# Patient Record
Sex: MALE | Race: WHITE | NOT HISPANIC OR LATINO | URBAN - METROPOLITAN AREA
[De-identification: names, ages, dates, MRNs, and addresses within clinical notes are randomized per-mention and may not be internally consistent; named-entity substitution may affect disease eponyms.]

---

## 2017-08-22 ENCOUNTER — IMPORTED ENCOUNTER (OUTPATIENT)
Dept: URBAN - METROPOLITAN AREA CLINIC 9 | Facility: CLINIC | Age: 62
End: 2017-08-22

## 2017-11-17 ENCOUNTER — IMPORTED ENCOUNTER (OUTPATIENT)
Dept: URBAN - METROPOLITAN AREA CLINIC 9 | Facility: CLINIC | Age: 62
End: 2017-11-17

## 2019-02-27 ENCOUNTER — IMPORTED ENCOUNTER (OUTPATIENT)
Dept: URBAN - METROPOLITAN AREA CLINIC 9 | Facility: CLINIC | Age: 64
End: 2019-02-27

## 2019-11-13 ENCOUNTER — IMPORTED ENCOUNTER (OUTPATIENT)
Dept: URBAN - METROPOLITAN AREA CLINIC 9 | Facility: CLINIC | Age: 64
End: 2019-11-13

## 2019-12-02 ENCOUNTER — IMPORTED ENCOUNTER (OUTPATIENT)
Dept: URBAN - METROPOLITAN AREA CLINIC 9 | Facility: CLINIC | Age: 64
End: 2019-12-02

## 2020-02-26 ENCOUNTER — IMPORTED ENCOUNTER (OUTPATIENT)
Dept: URBAN - METROPOLITAN AREA CLINIC 9 | Facility: CLINIC | Age: 65
End: 2020-02-26

## 2020-04-06 ENCOUNTER — IMPORTED ENCOUNTER (OUTPATIENT)
Dept: URBAN - METROPOLITAN AREA CLINIC 9 | Facility: CLINIC | Age: 65
End: 2020-04-06

## 2020-04-20 ENCOUNTER — IMPORTED ENCOUNTER (OUTPATIENT)
Dept: URBAN - METROPOLITAN AREA CLINIC 9 | Facility: CLINIC | Age: 65
End: 2020-04-20

## 2020-05-04 ENCOUNTER — IMPORTED ENCOUNTER (OUTPATIENT)
Dept: URBAN - METROPOLITAN AREA CLINIC 9 | Facility: CLINIC | Age: 65
End: 2020-05-04

## 2020-06-01 ENCOUNTER — IMPORTED ENCOUNTER (OUTPATIENT)
Dept: URBAN - METROPOLITAN AREA CLINIC 9 | Facility: CLINIC | Age: 65
End: 2020-06-01

## 2020-07-13 ENCOUNTER — IMPORTED ENCOUNTER (OUTPATIENT)
Dept: URBAN - METROPOLITAN AREA CLINIC 9 | Facility: CLINIC | Age: 65
End: 2020-07-13

## 2020-07-16 NOTE — PATIENT DISCUSSION
Lid closure problems likely associated with Graves' disease. Recommend gel drops at bedtime. May be having nocturnal exophthalmos.

## 2020-10-19 ENCOUNTER — IMPORTED ENCOUNTER (OUTPATIENT)
Dept: URBAN - METROPOLITAN AREA CLINIC 9 | Facility: CLINIC | Age: 65
End: 2020-10-19

## 2021-03-09 ENCOUNTER — IMPORTED ENCOUNTER (OUTPATIENT)
Dept: URBAN - METROPOLITAN AREA CLINIC 9 | Facility: CLINIC | Age: 66
End: 2021-03-09

## 2021-04-19 ENCOUNTER — IMPORTED ENCOUNTER (OUTPATIENT)
Dept: URBAN - METROPOLITAN AREA CLINIC 9 | Facility: CLINIC | Age: 66
End: 2021-04-19

## 2021-10-16 ASSESSMENT — TONOMETRY
OS_IOP_MMHG: 14
OS_IOP_MMHG: 13
OS_IOP_MMHG: 13
OD_IOP_MMHG: 13
OS_IOP_MMHG: 13
OD_IOP_MMHG: 10
OD_IOP_MMHG: 17
OS_IOP_MMHG: 14
OD_IOP_MMHG: 13
OS_IOP_MMHG: 13
OS_IOP_MMHG: 16
OS_IOP_MMHG: 13
OD_IOP_MMHG: 17
OS_IOP_MMHG: 10
OS_IOP_MMHG: 14
OD_IOP_MMHG: 15
OS_IOP_MMHG: 16
OD_IOP_MMHG: 11
OD_IOP_MMHG: 15
OD_IOP_MMHG: 18
OD_IOP_MMHG: 13
OD_IOP_MMHG: 16
OD_IOP_MMHG: 15
OD_IOP_MMHG: 14
OS_IOP_MMHG: 14
OD_IOP_MMHG: 16
OS_IOP_MMHG: 11
OS_IOP_MMHG: 12

## 2021-10-16 ASSESSMENT — VISUAL ACUITY
OS_CC: 20/30 SN
OS_CC: 20/40 -2 SN
OD_CC: 20/25 -2 SN
OS_SC: 20/100 -2 SN
OD_SC: 20/30 - SN
OD_CC: 20/20 SN
OS_CC: 20/40 - SN
OD_SC: 20/25 - SN
OD_CC: 20/20 - SN
OS_CC: 20/40 SN
OD_CC: 20/30 SN
OS_CC: 20/40 - SN
OD_CC: 20/20 -2 SN
OS_CC: 20/40 - SN
OD_CC: 20/20 SN
OS_CC: 20/25 - SN
OS_CC: 20/30 - SN
OS_CC: 20/30 - SN
OD_SC: 20/30 SN
OD_CC: 20/20 - SN
OS_SC: 20/60 SN
OS_CC: 20/50 + SN
OD_CC: 20/25 + SN
OS_CC: 20/40 - SN
OD_CC: 20/20 -2 SN
OS_CC: 20/40 SN
OD_CC: 20/25 SN
OD_CC: 20/25 + SN
OS_CC: 20/30 - SN
OS_CC: 20/30 -2 SN
OS_SC: 20/60 SN
OS_CC: 20/25 -2 SN
OD_CC: 20/25 SN
OD_CC: 20/25 -2 SN
OD_CC: 20/25 -2 SN
OD_CC: 20/25 SN
OS_CC: 20/30 + SN

## 2022-03-21 ENCOUNTER — ESTABLISHED PATIENT (OUTPATIENT)
Dept: URBAN - METROPOLITAN AREA CLINIC 14 | Facility: CLINIC | Age: 67
End: 2022-03-21

## 2022-03-21 DIAGNOSIS — H35.372: ICD-10-CM

## 2022-03-21 DIAGNOSIS — H35.413: ICD-10-CM

## 2022-03-21 DIAGNOSIS — H33.312: ICD-10-CM

## 2022-03-21 DIAGNOSIS — H43.812: ICD-10-CM

## 2022-03-21 DIAGNOSIS — H25.13: ICD-10-CM

## 2022-03-21 PROCEDURE — 92134 CPTRZ OPH DX IMG PST SGM RTA: CPT

## 2022-03-21 PROCEDURE — 92014 COMPRE OPH EXAM EST PT 1/>: CPT

## 2022-03-21 ASSESSMENT — TONOMETRY
OD_IOP_MMHG: 16
OS_IOP_MMHG: 16

## 2022-03-21 ASSESSMENT — VISUAL ACUITY
OS_CC: 20/50+2
OD_CC: 20/25-1

## 2022-03-22 ENCOUNTER — ESTABLISHED PATIENT (OUTPATIENT)
Dept: URBAN - METROPOLITAN AREA CLINIC 14 | Facility: CLINIC | Age: 67
End: 2022-03-22

## 2022-03-22 DIAGNOSIS — H43.812: ICD-10-CM

## 2022-03-22 DIAGNOSIS — H25.13: ICD-10-CM

## 2022-03-22 DIAGNOSIS — H33.312: ICD-10-CM

## 2022-03-22 DIAGNOSIS — H35.372: ICD-10-CM

## 2022-03-22 DIAGNOSIS — H35.413: ICD-10-CM

## 2022-03-22 PROCEDURE — 92134 CPTRZ OPH DX IMG PST SGM RTA: CPT

## 2022-03-22 PROCEDURE — 92014 COMPRE OPH EXAM EST PT 1/>: CPT

## 2022-03-22 PROCEDURE — 92015 DETERMINE REFRACTIVE STATE: CPT

## 2022-03-22 ASSESSMENT — VISUAL ACUITY
OS_CC: 20/50-1
OD_CC: 20/30-1
OD_GLARE: 20/30
OS_GLARE: 20/50

## 2022-03-22 ASSESSMENT — TONOMETRY
OD_IOP_MMHG: 16
OS_IOP_MMHG: 15

## 2022-10-03 ENCOUNTER — ESTABLISHED PATIENT (OUTPATIENT)
Dept: URBAN - METROPOLITAN AREA CLINIC 14 | Facility: CLINIC | Age: 67
End: 2022-10-03

## 2022-10-03 DIAGNOSIS — H43.812: ICD-10-CM

## 2022-10-03 DIAGNOSIS — H35.413: ICD-10-CM

## 2022-10-03 DIAGNOSIS — H35.372: ICD-10-CM

## 2022-10-03 DIAGNOSIS — H33.312: ICD-10-CM

## 2022-10-03 PROCEDURE — 92014 COMPRE OPH EXAM EST PT 1/>: CPT

## 2022-10-03 PROCEDURE — 92134 CPTRZ OPH DX IMG PST SGM RTA: CPT

## 2022-10-03 ASSESSMENT — TONOMETRY
OD_IOP_MMHG: 11
OS_IOP_MMHG: 11

## 2022-10-03 ASSESSMENT — VISUAL ACUITY
OD_CC: 20/20
OS_CC: 20/40-1

## 2022-12-12 ENCOUNTER — ESTABLISHED PATIENT (OUTPATIENT)
Dept: URBAN - METROPOLITAN AREA CLINIC 14 | Facility: CLINIC | Age: 67
End: 2022-12-12

## 2022-12-12 PROCEDURE — 99211PRE PRE OP VISIT

## 2022-12-12 ASSESSMENT — TONOMETRY
OD_IOP_MMHG: 18
OS_IOP_MMHG: 17

## 2022-12-12 ASSESSMENT — VISUAL ACUITY
OS_CC: 20/50+2
OD_CC: 20/20

## 2023-12-04 ENCOUNTER — ESTABLISHED PATIENT (OUTPATIENT)
Dept: URBAN - METROPOLITAN AREA CLINIC 14 | Facility: CLINIC | Age: 68
End: 2023-12-04

## 2023-12-04 DIAGNOSIS — H25.13: ICD-10-CM

## 2023-12-04 DIAGNOSIS — H43.812: ICD-10-CM

## 2023-12-04 DIAGNOSIS — H33.312: ICD-10-CM

## 2023-12-04 DIAGNOSIS — H35.372: ICD-10-CM

## 2023-12-04 DIAGNOSIS — H35.413: ICD-10-CM

## 2023-12-04 PROCEDURE — 99214 OFFICE O/P EST MOD 30 MIN: CPT

## 2023-12-04 PROCEDURE — 92134 CPTRZ OPH DX IMG PST SGM RTA: CPT

## 2023-12-04 ASSESSMENT — TONOMETRY
OD_IOP_MMHG: 18
OS_IOP_MMHG: 17

## 2023-12-04 ASSESSMENT — VISUAL ACUITY
OD_CC: 20/20
OS_CC: 20/60

## 2024-01-26 ENCOUNTER — POST-OP (OUTPATIENT)
Dept: URBAN - METROPOLITAN AREA CLINIC 17 | Facility: CLINIC | Age: 69
End: 2024-01-26

## 2024-01-26 DIAGNOSIS — H35.372: ICD-10-CM

## 2024-01-26 PROCEDURE — 99024 POSTOP FOLLOW-UP VISIT: CPT

## 2024-01-26 ASSESSMENT — TONOMETRY
OS_IOP_MMHG: 9
OD_IOP_MMHG: 15

## 2024-01-26 ASSESSMENT — VISUAL ACUITY
OD_CC: 20/20-1
OS_CC: 20/70-1

## 2024-02-12 ENCOUNTER — POST-OP (OUTPATIENT)
Dept: URBAN - METROPOLITAN AREA CLINIC 14 | Facility: CLINIC | Age: 69
End: 2024-02-12

## 2024-02-12 DIAGNOSIS — H33.312: ICD-10-CM

## 2024-02-12 DIAGNOSIS — H43.812: ICD-10-CM

## 2024-02-12 DIAGNOSIS — H35.372: ICD-10-CM

## 2024-02-12 PROCEDURE — 99024 POSTOP FOLLOW-UP VISIT: CPT

## 2024-02-12 PROCEDURE — 92134 CPTRZ OPH DX IMG PST SGM RTA: CPT

## 2024-02-12 ASSESSMENT — TONOMETRY
OD_IOP_MMHG: 16
OS_IOP_MMHG: 16

## 2024-02-12 ASSESSMENT — VISUAL ACUITY
OS_CC: 20/60-1
OD_CC: 20/20-1

## 2024-04-01 ENCOUNTER — POST-OP (OUTPATIENT)
Dept: URBAN - METROPOLITAN AREA CLINIC 14 | Facility: CLINIC | Age: 69
End: 2024-04-01

## 2024-04-01 DIAGNOSIS — H35.372: ICD-10-CM

## 2024-04-01 PROCEDURE — 99024 POSTOP FOLLOW-UP VISIT: CPT

## 2024-04-01 ASSESSMENT — TONOMETRY
OS_IOP_MMHG: 10
OD_IOP_MMHG: 13

## 2024-04-01 ASSESSMENT — VISUAL ACUITY
OD_CC: 20/25
OS_CC: 20/200

## 2024-05-23 ENCOUNTER — EMERGENCY VISIT (OUTPATIENT)
Dept: URBAN - METROPOLITAN AREA CLINIC 14 | Facility: CLINIC | Age: 69
End: 2024-05-23

## 2024-05-23 DIAGNOSIS — H33.312: ICD-10-CM

## 2024-05-23 DIAGNOSIS — H25.13: ICD-10-CM

## 2024-05-23 DIAGNOSIS — H35.372: ICD-10-CM

## 2024-05-23 DIAGNOSIS — H43.812: ICD-10-CM

## 2024-05-23 PROCEDURE — 92134 CPTRZ OPH DX IMG PST SGM RTA: CPT

## 2024-05-23 PROCEDURE — 99213 OFFICE O/P EST LOW 20 MIN: CPT

## 2024-05-23 ASSESSMENT — VISUAL ACUITY
OS_PH: 20/400
OD_CC: 20/25
OU_CC: 20/20
OS_CC: 20/400

## 2024-05-23 ASSESSMENT — TONOMETRY
OD_IOP_MMHG: 12
OS_IOP_MMHG: 11

## 2024-06-24 ENCOUNTER — ESTABLISHED PATIENT (OUTPATIENT)
Facility: LOCATION | Age: 69
End: 2024-06-24

## 2024-06-24 DIAGNOSIS — H25.13: ICD-10-CM

## 2024-06-24 DIAGNOSIS — H35.413: ICD-10-CM

## 2024-06-24 DIAGNOSIS — H33.312: ICD-10-CM

## 2024-06-24 DIAGNOSIS — H35.372: ICD-10-CM

## 2024-06-24 PROCEDURE — 92015 DETERMINE REFRACTIVE STATE: CPT

## 2024-06-24 PROCEDURE — 92134 CPTRZ OPH DX IMG PST SGM RTA: CPT

## 2024-06-24 PROCEDURE — 99214 OFFICE O/P EST MOD 30 MIN: CPT

## 2024-06-24 ASSESSMENT — VISUAL ACUITY
OS_GLARE: 20/100-1
OD_CC: 20/25
OS_CC: 20/200
OU_SC: 20/40
OD_SC: 20/40-1
OS_SC: 20/200
OD_GLARE: 20/25
OU_CC: 20/25

## 2024-06-24 ASSESSMENT — TONOMETRY
OD_IOP_MMHG: 15
OS_IOP_MMHG: 14

## 2024-07-09 ENCOUNTER — PRE-OP/H&P (OUTPATIENT)
Dept: URBAN - METROPOLITAN AREA CLINIC 14 | Facility: CLINIC | Age: 69
End: 2024-07-09

## 2024-07-09 DIAGNOSIS — H43.812: ICD-10-CM

## 2024-07-09 DIAGNOSIS — H25.13: ICD-10-CM

## 2024-07-09 DIAGNOSIS — H35.413: ICD-10-CM

## 2024-07-09 DIAGNOSIS — H33.312: ICD-10-CM

## 2024-07-09 DIAGNOSIS — H35.372: ICD-10-CM

## 2024-07-09 PROCEDURE — 92136 OPHTHALMIC BIOMETRY: CPT

## 2024-07-09 PROCEDURE — 99211PRE PRE OP VISIT

## 2024-08-08 ENCOUNTER — POST-OP (OUTPATIENT)
Facility: LOCATION | Age: 69
End: 2024-08-08

## 2024-08-08 ENCOUNTER — SURGERY/PROCEDURE (OUTPATIENT)
Dept: URBAN - METROPOLITAN AREA CLINIC 14 | Facility: CLINIC | Age: 69
End: 2024-08-08

## 2024-08-08 DIAGNOSIS — Z98.42: ICD-10-CM

## 2024-08-08 DIAGNOSIS — H25.13: ICD-10-CM

## 2024-08-08 PROCEDURE — 99199PCV PROF CUSTOM VISION PACKAGE

## 2024-08-08 PROCEDURE — 66984CV REMOVE CATARACT, INSERT LENS, CUSTOM VISION

## 2024-08-08 ASSESSMENT — TONOMETRY: OS_IOP_MMHG: 22

## 2024-08-08 ASSESSMENT — VISUAL ACUITY: OS_SC: 20/50+1

## 2024-08-28 ENCOUNTER — POST-OP (OUTPATIENT)
Dept: URBAN - METROPOLITAN AREA CLINIC 14 | Facility: CLINIC | Age: 69
End: 2024-08-28

## 2024-08-28 DIAGNOSIS — H43.812: ICD-10-CM

## 2024-08-28 DIAGNOSIS — H35.372: ICD-10-CM

## 2024-08-28 DIAGNOSIS — Z98.42: ICD-10-CM

## 2024-08-28 DIAGNOSIS — H33.312: ICD-10-CM

## 2024-08-28 DIAGNOSIS — H25.11: ICD-10-CM

## 2024-08-28 DIAGNOSIS — H35.413: ICD-10-CM

## 2024-08-28 ASSESSMENT — VISUAL ACUITY
OS_SC: 20/50+2
OD_PH: 20/30-2
OD_SC: 20/40-1

## 2024-08-28 ASSESSMENT — TONOMETRY
OD_IOP_MMHG: 10
OS_IOP_MMHG: 12

## 2024-10-14 ENCOUNTER — COMPREHENSIVE EXAM (OUTPATIENT)
Dept: URBAN - METROPOLITAN AREA CLINIC 14 | Facility: CLINIC | Age: 69
End: 2024-10-14

## 2024-10-14 DIAGNOSIS — H35.372: ICD-10-CM

## 2024-10-14 DIAGNOSIS — H33.312: ICD-10-CM

## 2024-10-14 DIAGNOSIS — H35.413: ICD-10-CM

## 2024-10-14 PROCEDURE — 92134 CPTRZ OPH DX IMG PST SGM RTA: CPT

## 2024-10-14 PROCEDURE — 99214 OFFICE O/P EST MOD 30 MIN: CPT | Mod: 24

## 2025-01-22 NOTE — PATIENT DISCUSSION
98 Artificial Tears: One drop to both eyes 3-4 times daily. We recommend Systane or Refresh lubricating eye drops which can be found at any pharmacy.

## 2025-02-25 ENCOUNTER — COMPREHENSIVE EXAM (OUTPATIENT)
Age: 70
End: 2025-02-25

## 2025-02-25 DIAGNOSIS — H35.372: ICD-10-CM

## 2025-02-25 DIAGNOSIS — H35.413: ICD-10-CM

## 2025-02-25 DIAGNOSIS — H33.312: ICD-10-CM

## 2025-02-25 DIAGNOSIS — H25.11: ICD-10-CM

## 2025-02-25 DIAGNOSIS — H43.812: ICD-10-CM

## 2025-02-25 PROCEDURE — 92014 COMPRE OPH EXAM EST PT 1/>: CPT
